# Patient Record
Sex: MALE | Race: WHITE
[De-identification: names, ages, dates, MRNs, and addresses within clinical notes are randomized per-mention and may not be internally consistent; named-entity substitution may affect disease eponyms.]

---

## 2017-01-16 ENCOUNTER — HOSPITAL ENCOUNTER (OUTPATIENT)
Dept: HOSPITAL 10 - E/R | Age: 27
Setting detail: OBSERVATION
LOS: 2 days | Discharge: HOME | End: 2017-01-18
Attending: INTERNAL MEDICINE | Admitting: INTERNAL MEDICINE
Payer: COMMERCIAL

## 2017-01-16 VITALS — WEIGHT: 180.78 LBS | BODY MASS INDEX: 35.49 KG/M2 | HEIGHT: 60 IN

## 2017-01-16 DIAGNOSIS — Z72.0: ICD-10-CM

## 2017-01-16 DIAGNOSIS — Z87.01: ICD-10-CM

## 2017-01-16 DIAGNOSIS — S30.1XXA: Primary | ICD-10-CM

## 2017-01-16 DIAGNOSIS — K59.00: ICD-10-CM

## 2017-01-16 DIAGNOSIS — Z23: ICD-10-CM
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
## 2017-01-16 LAB
ALBUMIN SERPL-MCNC: 4.8 G/DL (ref 3.3–4.9)
ALBUMIN/GLOB SERPL: 1.33 {RATIO}
ALP SERPL-CCNC: 94 IU/L (ref 42–121)
ALT SERPL-CCNC: 29 IU/L (ref 13–69)
ANION GAP SERPL CALC-SCNC: 19 MMOL/L (ref 8–16)
APTT BLD: 27.6 SEC (ref 25–35)
AST SERPL-CCNC: 53 IU/L (ref 15–46)
BASOPHILS # BLD AUTO: 0.1 10^3/UL (ref 0–0.1)
BASOPHILS NFR BLD: 0.6 % (ref 0–2)
BILIRUB DIRECT SERPL-MCNC: 0 MG/DL (ref 0–0.2)
BILIRUB SERPL-MCNC: 1 MG/DL (ref 0.2–1.3)
BUN SERPL-MCNC: 12 MG/DL (ref 7–20)
CALCIUM SERPL-MCNC: 9.9 MG/DL (ref 8.4–10.2)
CHLORIDE SERPL-SCNC: 99 MMOL/L (ref 97–110)
CO2 SERPL-SCNC: 26 MMOL/L (ref 21–31)
CONDITION: 1
CREAT SERPL-MCNC: 0.95 MG/DL (ref 0.61–1.24)
EOSINOPHIL # BLD: 0.1 10^3/UL (ref 0–0.5)
EOSINOPHIL NFR BLD: 0.9 % (ref 0–7)
ERYTHROCYTE [DISTWIDTH] IN BLOOD BY AUTOMATED COUNT: 13.2 % (ref 11.5–14.5)
GLOBULIN SER-MCNC: 3.6 G/DL (ref 1.3–3.2)
GLUCOSE SERPL-MCNC: 94 MG/DL (ref 70–220)
HCT VFR BLD CALC: 44.1 % (ref 42–52)
HGB BLD-MCNC: 14.9 G/DL (ref 14–18)
INR PPP: 0.96
LYMPHOCYTES # BLD AUTO: 3.8 10^3/UL (ref 0.8–2.9)
LYMPHOCYTES NFR BLD AUTO: 28.3 % (ref 15–51)
MCH RBC QN AUTO: 30.7 PG (ref 29–33)
MCHC RBC AUTO-ENTMCNC: 33.7 G/DL (ref 32–37)
MCV RBC AUTO: 90.9 FL (ref 82–101)
MONOCYTES # BLD: 1.1 10^3/UL (ref 0.3–0.9)
MONOCYTES NFR BLD: 7.8 % (ref 0–11)
NEUTROPHILS # BLD: 8.5 10^3/UL (ref 1.6–7.5)
NEUTROPHILS NFR BLD AUTO: 62.4 % (ref 39–77)
NRBC # BLD MANUAL: 0 10^3/UL (ref 0–0)
NRBC BLD QL: 0 /100WBC (ref 0–0)
PLATELET # BLD: 313 10^3/UL (ref 140–440)
PMV BLD AUTO: 8.5 FL (ref 7.4–10.4)
POTASSIUM SERPL-SCNC: 3.7 MMOL/L (ref 3.5–5.1)
PROT SERPL-MCNC: 8.4 G/DL (ref 6.1–8.1)
PROTHROMBIN TIME: 12.8 SEC (ref 12.2–14.2)
PT RATIO: 1
RBC # BLD AUTO: 4.85 10^6/UL (ref 4.7–6.1)
SODIUM SERPL-SCNC: 140 MMOL/L (ref 135–144)
WBC # BLD AUTO: 13.6 10^3/UL (ref 4.8–10.8)
WBC # BLD: 13.6 10^3/UL (ref 4.8–10.8)

## 2017-01-16 PROCEDURE — 81001 URINALYSIS AUTO W/SCOPE: CPT

## 2017-01-16 PROCEDURE — 85730 THROMBOPLASTIN TIME PARTIAL: CPT

## 2017-01-16 PROCEDURE — 83690 ASSAY OF LIPASE: CPT

## 2017-01-16 PROCEDURE — 90686 IIV4 VACC NO PRSV 0.5 ML IM: CPT

## 2017-01-16 PROCEDURE — 86140 C-REACTIVE PROTEIN: CPT

## 2017-01-16 PROCEDURE — 74177 CT ABD & PELVIS W/CONTRAST: CPT

## 2017-01-16 PROCEDURE — 96374 THER/PROPH/DIAG INJ IV PUSH: CPT

## 2017-01-16 PROCEDURE — 99285 EMERGENCY DEPT VISIT HI MDM: CPT

## 2017-01-16 PROCEDURE — 85610 PROTHROMBIN TIME: CPT

## 2017-01-16 PROCEDURE — 85651 RBC SED RATE NONAUTOMATED: CPT

## 2017-01-16 PROCEDURE — 81003 URINALYSIS AUTO W/O SCOPE: CPT

## 2017-01-16 PROCEDURE — 96375 TX/PRO/DX INJ NEW DRUG ADDON: CPT

## 2017-01-16 PROCEDURE — 80053 COMPREHEN METABOLIC PANEL: CPT

## 2017-01-16 PROCEDURE — 36415 COLL VENOUS BLD VENIPUNCTURE: CPT

## 2017-01-16 PROCEDURE — 72195 MRI PELVIS W/O DYE: CPT

## 2017-01-16 PROCEDURE — 85025 COMPLETE CBC W/AUTO DIFF WBC: CPT

## 2017-01-16 PROCEDURE — G0378 HOSPITAL OBSERVATION PER HR: HCPCS

## 2017-01-16 PROCEDURE — 74181 MRI ABDOMEN W/O CONTRAST: CPT

## 2017-01-17 VITALS — DIASTOLIC BLOOD PRESSURE: 67 MMHG | RESPIRATION RATE: 18 BRPM | SYSTOLIC BLOOD PRESSURE: 126 MMHG

## 2017-01-17 VITALS — DIASTOLIC BLOOD PRESSURE: 74 MMHG | SYSTOLIC BLOOD PRESSURE: 124 MMHG | RESPIRATION RATE: 20 BRPM

## 2017-01-17 VITALS — HEART RATE: 77 BPM | TEMPERATURE: 97.8 F

## 2017-01-17 VITALS — RESPIRATION RATE: 16 BRPM | SYSTOLIC BLOOD PRESSURE: 126 MMHG | DIASTOLIC BLOOD PRESSURE: 73 MMHG

## 2017-01-17 LAB
ADD UMIC: YES
ALBUMIN SERPL-MCNC: 4.1 G/DL (ref 3.3–4.9)
ALBUMIN/GLOB SERPL: 1.46 {RATIO}
ALP SERPL-CCNC: 69 IU/L (ref 42–121)
ALT SERPL-CCNC: 31 IU/L (ref 13–69)
ANION GAP SERPL CALC-SCNC: 15 MMOL/L (ref 8–16)
AST SERPL-CCNC: 38 IU/L (ref 15–46)
BASOPHILS # BLD AUTO: 0 10^3/UL (ref 0–0.1)
BASOPHILS NFR BLD: 0.3 % (ref 0–2)
BILIRUB DIRECT SERPL-MCNC: 0 MG/DL (ref 0–0.2)
BILIRUB SERPL-MCNC: 0.8 MG/DL (ref 0.2–1.3)
BUN SERPL-MCNC: 12 MG/DL (ref 7–20)
CALCIUM SERPL-MCNC: 9.1 MG/DL (ref 8.4–10.2)
CHLORIDE SERPL-SCNC: 100 MMOL/L (ref 97–110)
CO2 SERPL-SCNC: 28 MMOL/L (ref 21–31)
COLOR UR: (no result)
CONDITION: 1
CREAT SERPL-MCNC: 1 MG/DL (ref 0.61–1.24)
EOSINOPHIL # BLD: 0.1 10^3/UL (ref 0–0.5)
EOSINOPHIL NFR BLD: 0.9 % (ref 0–7)
ERYTHROCYTE [DISTWIDTH] IN BLOOD BY AUTOMATED COUNT: 13.1 % (ref 11.5–14.5)
GLOBULIN SER-MCNC: 2.8 G/DL (ref 1.3–3.2)
GLUCOSE SERPL-MCNC: 89 MG/DL (ref 70–220)
GLUCOSE UR STRIP-MCNC: NEGATIVE %
HCT VFR BLD CALC: 40.1 % (ref 42–52)
HGB BLD-MCNC: 13.6 G/DL (ref 14–18)
ICTOTEST: NEGATIVE
KETONES UR STRIP.AUTO-MCNC: 15 MG/DL
LYMPHOCYTES # BLD AUTO: 1.8 10^3/UL (ref 0.8–2.9)
LYMPHOCYTES NFR BLD AUTO: 22.4 % (ref 15–51)
MCH RBC QN AUTO: 31.1 PG (ref 29–33)
MCHC RBC AUTO-ENTMCNC: 34 G/DL (ref 32–37)
MCV RBC AUTO: 91.5 FL (ref 82–101)
MONOCYTES # BLD: 0.6 10^3/UL (ref 0.3–0.9)
MONOCYTES NFR BLD: 7.9 % (ref 0–11)
MUCOUS THREADS #/AREA URNS HPF: (no result) /[HPF]
NEUTROPHILS # BLD: 5.5 10^3/UL (ref 1.6–7.5)
NEUTROPHILS NFR BLD AUTO: 68.5 % (ref 39–77)
NITRITE UR QL STRIP.AUTO: NEGATIVE
NRBC # BLD MANUAL: 0 10^3/UL (ref 0–0)
NRBC BLD QL: 0 /100WBC (ref 0–0)
PLATELET # BLD: 266 10^3/UL (ref 140–440)
PMV BLD AUTO: 8.3 FL (ref 7.4–10.4)
POTASSIUM SERPL-SCNC: 4.1 MMOL/L (ref 3.5–5.1)
PROT SERPL-MCNC: 6.9 G/DL (ref 6.1–8.1)
RBC # BLD AUTO: 4.38 10^6/UL (ref 4.7–6.1)
RBC # UR AUTO: (no result) /UL
RBC #/AREA URNS HPF: (no result) /HPF
SODIUM SERPL-SCNC: 139 MMOL/L (ref 135–144)
SQUAMOUS #/AREA URNS HPF: (no result) /[HPF]
URINE BILIRUBIN (DIP): (no result)
URINE TOTAL PROTEIN (DIP): NEGATIVE
UROBILINOGEN UR STRIP-ACNC: (no result) (ref 0.1–1)
WBC # BLD AUTO: 8.1 10^3/UL (ref 4.8–10.8)
WBC # BLD: 8.1 10^3/UL (ref 4.8–10.8)
WBC # UR STRIP: NEGATIVE /UL

## 2017-01-17 RX ADMIN — FOLIC ACID SCH MLS/HR: 5 INJECTION, SOLUTION INTRAMUSCULAR; INTRAVENOUS; SUBCUTANEOUS at 23:30

## 2017-01-17 RX ADMIN — DOCUSATE SODIUM SCH MG: 100 CAPSULE, LIQUID FILLED ORAL at 15:54

## 2017-01-17 RX ADMIN — FOLIC ACID SCH MLS/HR: 5 INJECTION, SOLUTION INTRAMUSCULAR; INTRAVENOUS; SUBCUTANEOUS at 15:55

## 2017-01-17 RX ADMIN — Medication SCH MLS/HR: at 14:07

## 2017-01-17 RX ADMIN — HYDROCORTISONE ACETATE SCH MG: 25 SUPPOSITORY RECTAL at 22:03

## 2017-01-17 RX ADMIN — HYDROCORTISONE ACETATE SCH MG: 25 SUPPOSITORY RECTAL at 15:54

## 2017-01-17 RX ADMIN — Medication SCH MLS/HR: at 22:03

## 2017-01-17 RX ADMIN — DOCUSATE SODIUM SCH MG: 100 CAPSULE, LIQUID FILLED ORAL at 22:03

## 2017-01-17 RX ADMIN — CIPROFLOXACIN SCH MLS/HR: 2 INJECTION, SOLUTION INTRAVENOUS at 22:03

## 2017-01-17 RX ADMIN — Medication SCH MLS/HR: at 06:51

## 2017-01-17 RX ADMIN — CIPROFLOXACIN SCH MLS/HR: 2 INJECTION, SOLUTION INTRAVENOUS at 08:58

## 2017-01-17 NOTE — HP
DATE OF ADMISSION: 01/17/2017

 

CHIEF COMPLAINT:  Abdominal pain.

 

HISTORY OF PRESENT ILLNESS:  The patient is a 26-year-old male with no significant past medical hist
ory who presented to the emergency department with right-sided abdominal pain, mainly at the right l
ower quadrant area.  He denied any associated nausea, vomiting, constipation, or diarrhea.  He also 
denied any fever, chills, chest pain or shortness of breath.  When he presented to the ER, a CT abdo
men and pelvis was done which showed thickening of the right inferior rectus muscle with inflammator
y change, which could be a muscular injury or even possibly infectious etiology or a hematoma.  He a
lso presented with a white count of 13.3.  Otherwise, the rest of his labs and vitals were in the no
rmal range.  The patient denied any trauma to the abdominal area.  

 

REVIEW OF SYSTEMS:  A 12-point review of systems was performed and negative except as mentioned in H
PI.

 

PAST MEDICAL HISTORY:  Denied.

 

SOCIAL HISTORY:  Denied a history of tobacco, alcohol or illicit drug use.

 

ALLERGIES:  NO KNOWN DRUG ALLERGIES.

 

HOME MEDICATIONS:  Pseudoephedrine

 

PHYSICAL EXAMINATION:

VITAL SIGNS:  Stable.

GENERAL:  No acute distress, answering questions appropriately, cooperative.

HEENT:  Normocephalic, atraumatic.  No scleral icterus.  Pupils are reactive to light.

CARDIOVASCULAR:  Regular rate and rhythm with no extra sounds.

LUNGS:  Clear.

ABDOMEN:  Soft.  There is some tenderness in the right lower quadrant area with no guarding, no rigi
dity, no rebound tenderness.

 

LABORATORIES:  WBC 13.3.

 

IMAGING:  CT abdomen and pelvis with results as mentioned in the HPI.

 

IMPRESSION:

1.  Right lower quadrant abdominal pain, secondary to possibly some muscular injury or possible infe
ctious etiology.

 

PLAN:  

1.  Provide pain medication.

2.  He will also be placed on antibiotics.  Will check a blood culture.  

3.  We will obtain an MRI to further evaluate what was seen on the CT abdomen and pelvis.

4.  For now, pain management and antibiotics will be the mainstay of treatment.  

 

Further workup and management per clinical course.

 

 

Dictated By: LEONIDAS GARSIA/LEONARD

DD:    01/17/2017 10:49:15

DT:    01/17/2017 11:21:21

Conf#: [id number]

DID#:  [id number]

## 2017-01-17 NOTE — ERA
ER Documentation


Chief Complaint


Date/Time


DATE: 1/17/17 


TIME: 02:16


Chief Complaint


sent by [facility] r/o appendecitis rlq pain and feverx1 week





HPI


26-year-old male who complains of right lower quadrant abdominal pain radiating 

to his right groin for the past week.  He does have low-grade temperatures.  

Patient was seen in urgent care and referred for possible appendicitis.  Pain 

is mild to moderate intensity.  He is also been constipated for a week.  No 

fevers no chills.  No other current complaints.





ROS


All systems reviewed and are negative except as per history of present illness.





Medications


Home Meds


Reported Medications


Fexofenadine/Pseudoephedrine (Allegra-D 24 Hour Tablet) 1 Each Tab.er.24h, 1 

EACH PO DAILY


   1/16/17





Allergies


Allergies:  


Coded Allergies:  


     No Known Allergy (Unverified , 1/16/17)





PMhx/Soc


Medical and Surgical Hx:  pt denies Surgical Hx


History of Surgery:  No


Anesthesia Reaction:  No


Hx Neurological Disorder:  No


Hx Respiratory Disorders:  Yes (PNA MARCH 2016)


Hx Cardiac Disorders:  No


Hx Psychiatric Problems:  No


Hx Miscellaneous Medical Probl:  No


Hx Alcohol Use:  Yes


Hx Substance Use:  Yes (MARIJUANA)


Hx Tobacco Use:  Yes


Smoking Status:  Current every day smoker





Physical Exam


Vitals





Vital Signs








  Date Time  Temp Pulse Resp B/P Pulse Ox O2 Delivery O2 Flow Rate FiO2


 


1/16/17 22:55 98.3 60 20 140/87 100 Room Air  


 


1/16/17 19:23 98.2 81 20 126/84 98   








Physical Exam


Const:  []


Head:   Atraumatic 


Eyes:    Normal Conjunctiva


ENT:    Normal External Ears, Nose and Mouth.


Neck:               Full range of motion..~ No meningismus.


Resp:    Clear to auscultation bilaterally


Cardio:    Regular rate and rhythm, no murmurs


Abd:    Soft, non tender, non distended. Normal bowel sounds


Skin:    No petechiae or rashes


Back:    No midline or flank tenderness


Ext:    No cyanosis, or edema


Neur:    Awake and alert


Psych:    Normal Mood and Affect


Result Diagram:  


1/16/17 2305                                                                   

             1/16/17 2305





Results 24 hrs





 Laboratory Tests








Test


  1/16/17


23:05 1/16/17


23:45


 


Activated Partial


Thromboplast Time 27.6Sec 


  


 


 


Alanine Aminotransferase


(ALT/SGPT) 29IU/L 


  


 


 


Albumin 4.8g/dl  


 


Albumin/Globulin Ratio 1.33  


 


Alkaline Phosphatase 94IU/L  


 


Anion Gap 19  


 


Aspartate Amino Transf


(AST/SGOT) 53IU/L 


  


 


 


Basophils # 0.110^3/ul  


 


Basophils % 0.6%  


 


Blood Urea Nitrogen 12mg/dl  


 


Calcium Level 9.9mg/dl  


 


Carbon Dioxide Level 26mmol/L  


 


Chloride Level 99mmol/L  


 


Creatinine 0.95mg/dl  


 


Direct Bilirubin 0.00mg/dl  


 


Eosinophils # 0.110^3/ul  


 


Eosinophils % 0.9%  


 


Globulin 3.60g/dl  


 


Glucose Level 94mg/dl  


 


Hematocrit 44.1%  


 


Hemoglobin 14.9g/dl  


 


INR International Normalized


Ratio 0.96 


  


 


 


Indirect Bilirubin 1.0mg/dl  


 


Lipase 44U/L  


 


Lymphocytes # 3.810^3/ul  


 


Lymphocytes % 28.3%  


 


Mean Corpuscular Hemoglobin 30.7pg  


 


Mean Corpuscular Hemoglobin


Concent 33.7g/dl 


  


 


 


Mean Corpuscular Volume 90.9fl  


 


Mean Platelet Volume 8.5fl  


 


Monocytes # 1.110^3/ul  


 


Monocytes % 7.8%  


 


Neutrophils # 8.510^3/ul  


 


Neutrophils % 62.4%  


 


Nucleated Red Blood Cells # 0.010^3/ul  


 


Nucleated Red Blood Cells % 0.0/100WBC  


 


Platelet Count 82942^3/UL  


 


Potassium Level 3.7mmol/L  


 


Prothrombin Time 12.8Sec  


 


Prothrombin Time Ratio 1.0  


 


Red Blood Count 4.8510^6/ul  


 


Red Cell Distribution Width 13.2%  


 


Sodium Level 140mmol/L  


 


Total Bilirubin 1.0mg/dl  


 


Total Protein 8.4g/dl  


 


White Blood Count 13.610^3/ul  


 


Urine Bilirubin  1+ 


 


Urine Clarity  CLEAR 


 


Urine Color  LT. YELLOW 


 


Urine Glucose  NEGATIVE% 


 


Urine Hemoglobin  TRACE 


 


Urine Ictotest  NEGATIVE 


 


Urine Ketones  15 


 


Urine Leukocyte Esterase  NEGATIVE 


 


Urine Microscopic RBC  0-2/HPF 


 


Urine Microscopic WBC  NONE SEEN/HPF 


 


Urine Mucus  FEW 


 


Urine Nitrite  NEGATIVE 


 


Urine Specific Gravity  >=1.030 


 


Urine Squamous Epithelial


Cells 


  RARE 


 


 


Urine Total Protein  NEGATIVE 


 


Urine Urobilinogen  0.2  E.U./dL 


 


Urine pH  6.0 








 Current Medications








 Medications


  (Trade)  Dose


 Ordered  Sig/Yoel


 Route


 PRN Reason  Start Time


 Stop Time Status Last Admin


Dose Admin


 


 Sodium Chloride


  (NS)  1,000 ml @ 


 1,000 mls/hr  Q1H STAT


 IV


   1/16/17 22:52


 1/16/17 23:51 DC 1/16/17 23:12


 


 


 Morphine Sulfate


  (morphine)  4 mg  ONCE  STAT


 IV


   1/16/17 22:52


 1/16/17 22:54 DC 1/16/17 23:13


 


 


 Ondansetron HCl 4


 mg  4 mg  ONCE  STAT


 IV


   1/16/17 22:52


 1/16/17 22:54 DC 1/16/17 23:12


 


 


 Sodium Chloride


  (NS)  100 ml @ ud  STK-MED ONCE


 .ROUTE


   1/16/17 23:42


 1/16/17 23:43 DC 1/17/17 00:03


 


 


 Iohexol 150 ml  150 ml  STK-MED ONCE


 .ROUTE


   1/16/17 23:42


 1/16/17 23:43 DC 1/17/17 00:03


 


 


 Piperacillin Sod/


 Tazobactam Sod


  (Zosyn 3.375gm/


 100 ml (Pmx))  100 ml @ 


 200 mls/hr  ONCE  ONCE


 IVPB


   1/17/17 01:30


 1/17/17 01:59 DC 1/17/17 01:19


 











Procedures/MDM


CBC:      Elevated white count


CMP:      [no e/o severe acidosis, alkalosis, renal failure, diabetic 

ketoacidosis, liver disease]


Lipase:               [no e/o pancreatitis]


PT/INR:   [normal coagulation]


Urine:      [no e/o acute infection or hematuria]





CT shows rectus sheath hematoma





Medical decision-making: Patient comes in with a 3 rectus sheath hematoma 

versus abscess.  At this point he was on antibiotics empirically.  Patient will 

be admitted to hospitalist.





Departure


Diagnosis:  


 Primary Impression:  


 Abdominal pain


 Qualified Code:  R10.31 - Right lower quadrant abdominal pain


 Additional Impression:  


 Rectus sheath hematoma


 Qualified Code:  S30.1XXA - Rectus sheath hematoma, initial encounter


Condition:  Stable











LEONIDAS BOURGEOISJackelin Jan 17, 2017 02:17

## 2017-01-17 NOTE — RADRPT
PROCEDURE:    CT ABDOMEN/PELVIS WITH CONTRAST  

 

CLINICAL INDICATION:   26-year-old male with abdominal pain. 

 

TECHNIQUE:   The study was performed utilizing a GE LightSpeAlphaClone VCT 64-slice CT scanner.  Direct axia
l sections were obtained through the abdomen and pelvis with the use of 100 cc of Omnipaque-300 iggy
onic intravenous contrast material. Sagittal and coronal reformations were obtained. Automated expos
ure control and iterative reconstruction techniques were utilized for this examination.  The images 
were reviewed on a PACS workstation.  CTD/vol = 10.7 mGy; Total Exam DLP = 672.1 mGy-cm. 

 

COMPARISON:    None. 

 

FINDINGS:

 

The lung bases are unremarkable.  There is no evidence for significant pleural effusion.  The liver 
has a normal size and contour without focal areas of abnormal density or contrast enhancement. No in
trahepatic nor extrahepatic biliary ductal dilatation is seen. The gallbladder demonstrates no wall 
thickening nor pericholecystic fluid. No biliary stones are evident. The pancreas is without areas o
f abnormal attenuation or contrast enhancement.  This spleen is identified and has a normal size wit
hout abnormal density or contrast enhancement. The adrenal glands are unremarkable. The kidneys are 
functional bilaterally without abnormal density. No hydroureteronephrosis nor nephroureterolithiasis
 is evident. The urinary bladder contains a small volume of urine with a mildly thickened wall measu
ring up to 8 mm.  There is diffuse asymmetric thickening of the inferior right rectus muscle extendi
ng from the mid to inferior aspect with mild surrounding inflammatory changes.  There is mild 

retained stool identified within the ascending and transverse colon without obstruction.  The append
ix is visualized and is without edema or surrounding inflammatory reaction. There is no significant 
free fluid.  The aortoiliac vessels are without aneurysmal dilatation. The osseous structures are in
tact. 

 

IMPRESSION:

 

1.  Retained stool without gross bowel obstruction.

2.  No CT evidence for appendicitis.

3.  Small volume bladder with mild thickened wall. 

4.  Diffusely asymmetric thickening right inferior rectus muscle with mild surrounding inflammatory 
changes which may represent a rectus muscle injury and mild hematoma. Other possibilities include in
fectious process.  Clinical correlation is necessary.

_____________________________________________ 

.Michael Bustillo MD, MD           Date    Time 

Electronically viewed and signed by .Michael Bustillo MD, MD on 01/17/2017 01:00 

 

D:  01/17/2017 01:00  T:  01/17/2017 01:00

.JOE

## 2017-01-17 NOTE — PN
Date/Time of Note


Date/Time of Note


DATE: 1/17/17 


TIME: 15:16





Assessment/Plan


VTE Prophylaxis


VTE Prophylaxis Intervention:  ambulation, contraindicated


VTE Contraindication Reason:  bleeding





Lines/Catheters


IV Catheter Type (from Nrsg):  Saline Lock





Assessment/Plan


Chief Complaint/Hosp Course


A/P


1) Abd wall hematoma/ rectus sheath tear; stable. mri; pain control/ binder; dc 

home 1/18


2) Constipation


3) Tobacco/ marijuana


Problems:  





Subjective


24 Hr Interval Summary


Free Text/Dictation


less pain. no fever. snowboarding in [place] recently. also works, with 

motorbike repair; lots of lifting.





Exam/Review of Systems


Vital Signs


Vitals





 Vital Signs








  Date Time  Temp Pulse Resp B/P Pulse Ox O2 Delivery O2 Flow Rate FiO2


 


1/17/17 07:41 98.6 62 18 126/67 98   


 


1/17/17 03:30      Room Air  














 Intake and Output   


 


 1/16/17 1/16/17 1/17/17





 15:00 23:00 07:00


 


Intake Total   0 ml


 


Output Total   0 ml


 


Balance   0 ml











Exam


Constitutional:  alert


Respiratory:  clear to auscultation


Cardiovascular:  regular rate and rhythm


Gastrointestinal:  soft (mild tender; no r r g; no ecchymosis)


Extremities:  other (no edema)





Results


Result Diagram:  


1/17/17 0940                                                                   

             1/17/17 0940





Results 24 hrs





Laboratory Tests








Test


  1/16/17


23:05 1/16/17


23:45 1/17/17


09:40


 


Activated Partial


Thromboplast Time 27.6  


  


  


 


 


Alanine Aminotransferase


(ALT/SGPT) 29  


  


  31  


 


 


Albumin 4.8    4.1  


 


Albumin/Globulin Ratio 1.33    1.46  


 


Alkaline Phosphatase 94    69  


 


Anion Gap 19  H  15  


 


Aspartate Amino Transf


(AST/SGOT) 53  H


  


  38  


 


 


Basophils # 0.1    0.0  


 


Basophils % 0.6    0.3  


 


Blood Urea Nitrogen 12    12  


 


Calcium Level 9.9    9.1  


 


Carbon Dioxide Level 26    28  


 


Chloride Level 99    100  


 


Creatinine 0.95    1.00  


 


Direct Bilirubin 0.00    0.00  


 


Eosinophils # 0.1    0.1  


 


Eosinophils % 0.9    0.9  


 


Globulin 3.60  H  2.80  


 


Glucose Level 94    89  


 


Hematocrit 44.1    40.1  L


 


Hemoglobin 14.9    13.6  L


 


INR International Normalized


Ratio 0.96  


  


  


 


 


Indirect Bilirubin 1.0    0.8  


 


Lipase 44    


 


Lymphocytes # 3.8  H  1.8  


 


Lymphocytes % 28.3    22.4  


 


Mean Corpuscular Hemoglobin 30.7    31.1  


 


Mean Corpuscular Hemoglobin


Concent 33.7  


  


  34.0  


 


 


Mean Corpuscular Volume 90.9    91.5  


 


Mean Platelet Volume 8.5    8.3  


 


Monocytes # 1.1  H  0.6  


 


Monocytes % 7.8    7.9  


 


Neutrophils # 8.5  H  5.5  


 


Neutrophils % 62.4    68.5  


 


Nucleated Red Blood Cells # 0.0    0.0  


 


Nucleated Red Blood Cells % 0.0    0.0  


 


Platelet Count 313    266  


 


Potassium Level 3.7    4.1  


 


Prothrombin Time 12.8    


 


Prothrombin Time Ratio 1.0    


 


Red Blood Count 4.85    4.38  L


 


Red Cell Distribution Width 13.2    13.1  


 


Sodium Level 140    139  


 


Total Bilirubin 1.0    0.8  


 


Total Protein 8.4  H  6.9  #


 


White Blood Count 13.6  H  8.1  #


 


Urine Bilirubin  1+  H 


 


Urine Clarity  CLEAR   


 


Urine Color  LT. YELLOW   


 


Urine Glucose  NEGATIVE   


 


Urine Hemoglobin  TRACE   


 


Urine Ictotest  NEGATIVE   


 


Urine Ketones  15   


 


Urine Leukocyte Esterase  NEGATIVE   


 


Urine Microscopic RBC  0-2   


 


Urine Microscopic WBC  NONE SEEN   


 


Urine Mucus  FEW   


 


Urine Nitrite  NEGATIVE   


 


Urine Specific Gravity  >=1.030  H 


 


Urine Squamous Epithelial


Cells 


  RARE  


  


 


 


Urine Total Protein  NEGATIVE   


 


Urine Urobilinogen  0.2  E.U./dL   


 


Urine pH  6.0   











Medications


Medications





 Current Medications


Morphine Sulfate (morphine) 3 mg Q4H  PRN IV PAIN Last administered on 1/17/ 17at 06:51; Admin Dose 3 MG;  Start 1/17/17 at 06:00


Ondansetron HCl 4 mg 4 mg Q6H  PRN IV NAUSEA AND/OR VOMITING;  Start 1/17/17 at 

06:00


Metronidazole 100 ml @  100 mls/hr Q8 IVPB  Last administered on 1/17/17at 14:07

; Admin Dose 100 MLS/HR;  Start 1/17/17 at 06:00


Ciprofloxacin/ Dextrose (Cipro Ivpb) 200 ml @  200 mls/hr Q12 IVPB  Last 

administered on 1/17/17at 08:58; Admin Dose 200 MLS/HR;  Start 1/17/17 at 09:00


Influenza Virus Vaccine (Fluzone) 0.5 ml ONCE ONCE IM* ;  Start 1/18/17 at 09:00

;  Stop 1/18/17 at 09:01


Acetaminophen (Tylenol Tab) 650 mg Q4H  PRN PO PAIN AND OR ELEVATED TEMP Last 

administered on 1/17/17at 12:11; Admin Dose 650 MG;  Start 1/17/17 at 12:00


Acetaminophen/ Hydrocodone Bitart (Norco (10/325)) 1 tab Q4H  PRN PO PAIN;  

Start 1/17/17 at 14:30


Docusate Sodium (Colace) 100 mg BID PO ;  Start 1/17/17 at 14:30


Hydrocortisone (Anusol-Hc Supp) 25 mg BID FL ;  Start 1/17/17 at 16:00


Bisacodyl (Dulcolax) 10 mg DAILY  PRN PO CONSTIPATION;  Start 1/17/17 at 14:30











JACQUES PATTERSON MD Jan 17, 2017 15:19

## 2017-01-18 VITALS — RESPIRATION RATE: 18 BRPM | SYSTOLIC BLOOD PRESSURE: 126 MMHG | DIASTOLIC BLOOD PRESSURE: 73 MMHG

## 2017-01-18 LAB
ALBUMIN SERPL-MCNC: 3.9 G/DL (ref 3.3–4.9)
ALBUMIN/GLOB SERPL: 1.21 {RATIO}
ALP SERPL-CCNC: 64 IU/L (ref 42–121)
ALT SERPL-CCNC: 30 IU/L (ref 13–69)
ANION GAP SERPL CALC-SCNC: 15 MMOL/L (ref 8–16)
AST SERPL-CCNC: 34 IU/L (ref 15–46)
BASOPHILS # BLD AUTO: 0 10^3/UL (ref 0–0.1)
BASOPHILS NFR BLD: 0.4 % (ref 0–2)
BILIRUB DIRECT SERPL-MCNC: 0 MG/DL (ref 0–0.2)
BILIRUB SERPL-MCNC: 0.5 MG/DL (ref 0.2–1.3)
BUN SERPL-MCNC: 10 MG/DL (ref 7–20)
CALCIUM SERPL-MCNC: 9.2 MG/DL (ref 8.4–10.2)
CHLORIDE SERPL-SCNC: 103 MMOL/L (ref 97–110)
CO2 SERPL-SCNC: 26 MMOL/L (ref 21–31)
CONDITION: 1
CREAT SERPL-MCNC: 0.95 MG/DL (ref 0.61–1.24)
CRP SERPL-MCNC: 3.1 MG/DL (ref 0–0.9)
EOSINOPHIL # BLD: 0.1 10^3/UL (ref 0–0.5)
EOSINOPHIL NFR BLD: 0.8 % (ref 0–7)
ERYTHROCYTE [DISTWIDTH] IN BLOOD BY AUTOMATED COUNT: 12.7 % (ref 11.5–14.5)
GLOBULIN SER-MCNC: 3.2 G/DL (ref 1.3–3.2)
GLUCOSE SERPL-MCNC: 91 MG/DL (ref 70–220)
HCT VFR BLD CALC: 39.5 % (ref 42–52)
HGB BLD-MCNC: 13.6 G/DL (ref 14–18)
LYMPHOCYTES # BLD AUTO: 2.2 10^3/UL (ref 0.8–2.9)
LYMPHOCYTES NFR BLD AUTO: 27.5 % (ref 15–51)
MCH RBC QN AUTO: 31.3 PG (ref 29–33)
MCHC RBC AUTO-ENTMCNC: 34.5 G/DL (ref 32–37)
MCV RBC AUTO: 90.8 FL (ref 82–101)
MONOCYTES # BLD: 0.8 10^3/UL (ref 0.3–0.9)
MONOCYTES NFR BLD: 9.8 % (ref 0–11)
NEUTROPHILS # BLD: 4.9 10^3/UL (ref 1.6–7.5)
NEUTROPHILS NFR BLD AUTO: 61.5 % (ref 39–77)
NRBC # BLD MANUAL: 0 10^3/UL (ref 0–0)
NRBC BLD QL: 0 /100WBC (ref 0–0)
PLATELET # BLD: 282 10^3/UL (ref 140–440)
PMV BLD AUTO: 8.4 FL (ref 7.4–10.4)
POTASSIUM SERPL-SCNC: 4.3 MMOL/L (ref 3.5–5.1)
PROT SERPL-MCNC: 7.1 G/DL (ref 6.1–8.1)
RBC # BLD AUTO: 4.35 10^6/UL (ref 4.7–6.1)
SODIUM SERPL-SCNC: 140 MMOL/L (ref 135–144)
WBC # BLD AUTO: 7.9 10^3/UL (ref 4.8–10.8)
WBC # BLD: 7.9 10^3/UL (ref 4.8–10.8)

## 2017-01-18 RX ADMIN — FOLIC ACID SCH MLS/HR: 5 INJECTION, SOLUTION INTRAMUSCULAR; INTRAVENOUS; SUBCUTANEOUS at 07:30

## 2017-01-18 RX ADMIN — Medication SCH MLS/HR: at 05:14

## 2017-01-18 RX ADMIN — FOLIC ACID SCH MLS/HR: 5 INJECTION, SOLUTION INTRAMUSCULAR; INTRAVENOUS; SUBCUTANEOUS at 08:20

## 2017-01-18 RX ADMIN — HYDROCORTISONE ACETATE SCH MG: 25 SUPPOSITORY RECTAL at 09:00

## 2017-01-18 RX ADMIN — Medication SCH MLS/HR: at 14:00

## 2017-01-18 RX ADMIN — CIPROFLOXACIN SCH MLS/HR: 2 INJECTION, SOLUTION INTRAVENOUS at 09:11

## 2017-01-18 RX ADMIN — DOCUSATE SODIUM SCH MG: 100 CAPSULE, LIQUID FILLED ORAL at 09:10

## 2017-01-18 RX ADMIN — FOLIC ACID SCH MLS/HR: 5 INJECTION, SOLUTION INTRAMUSCULAR; INTRAVENOUS; SUBCUTANEOUS at 15:30

## 2017-01-18 NOTE — PN
Date/Time of Note


Date/Time of Note


DATE: 1/18/17 


TIME: 14:51





Assessment/Plan


VTE Prophylaxis


VTE Prophylaxis Intervention:  ambulation, SCD's





Lines/Catheters


IV Catheter Type (from Nrs):  Peripheral IV





Assessment/Plan


Chief Complaint/Hosp Course


Assessment and plan





1. Abdominal wall suspect hematoma versus rectus sheath tear. MRI of abdomen 

unremarkable. MRI of the pelvis is pending. Continue with analgesics as needed.





2. History of constipation. We'll provide with laxatives as needed





Disposition and plan: Follow up on MRI of pelvis. Continue analgesics as 

needed. Further recommendations per clinical course





Discussed plan of care with Dr. Millan


Problems:  





Subjective


24 Hr Interval Summary


Free Text/Dictation


No acute signs or symptoms of distress





Exam/Review of Systems


Vital Signs


Vitals





 Vital Signs








  Date Time  Temp Pulse Resp B/P Pulse Ox O2 Delivery O2 Flow Rate FiO2


 


1/18/17 07:46 98.2 57 18 126/73 98   


 


1/17/17 03:30      Room Air  














 Intake and Output   


 


 1/17/17 1/17/17 1/18/17





 15:00 23:00 07:00


 


Intake Total 200 ml 1245 ml 1375 ml


 


Balance 200 ml 1245 ml 1375 ml











Exam


General: No acute signs or symptoms of distress


Eyes: pupils equal round, Anicteric sclera


Neck: Supple nontender, no JVD


Cardiac: S1, S2 auscultated, regular rhythm and rate


Pulmonary: No coarse rhonchi or breathing auscultated


GI: Minimal tenderness near pelvis area


Extremities: No edema bilateral lower extremities


Skin: Clean dry and intact


Neurologic: Alert to person place and time and situation





Results


Result Diagram:  


1/18/17 0600                                                                   

             1/18/17 0600





Results 24 hrs





Laboratory Tests








Test


  1/18/17


06:00


 


Alanine Aminotransferase


(ALT/SGPT) 30  


 


 


Albumin 3.9  


 


Albumin/Globulin Ratio 1.21  


 


Alkaline Phosphatase 64  


 


Anion Gap 15  


 


Aspartate Amino Transf


(AST/SGOT) 34  


 


 


Basophils # 0.0  


 


Basophils % 0.4  


 


Blood Urea Nitrogen 10  


 


C-Reactive Protein 3.1  H


 


Calcium Level 9.2  


 


Carbon Dioxide Level 26  


 


Chloride Level 103  


 


Creatinine 0.95  


 


Direct Bilirubin 0.00  


 


Eosinophils # 0.1  


 


Eosinophils % 0.8  


 


Erythrocyte Sedimentation Rate 4  


 


Globulin 3.20  


 


Glucose Level 91  


 


Hematocrit 39.5  L


 


Hemoglobin 13.6  L


 


Indirect Bilirubin 0.5  


 


Lymphocytes # 2.2  


 


Lymphocytes % 27.5  


 


Mean Corpuscular Hemoglobin 31.3  


 


Mean Corpuscular Hemoglobin


Concent 34.5  


 


 


Mean Corpuscular Volume 90.8  


 


Mean Platelet Volume 8.4  


 


Monocytes # 0.8  


 


Monocytes % 9.8  


 


Neutrophils # 4.9  


 


Neutrophils % 61.5  


 


Nucleated Red Blood Cells # 0.0  


 


Nucleated Red Blood Cells % 0.0  


 


Platelet Count 282  


 


Potassium Level 4.3  


 


Red Blood Count 4.35  L


 


Red Cell Distribution Width 12.7  


 


Sodium Level 140  


 


Total Bilirubin 0.5  


 


Total Protein 7.1  


 


White Blood Count 7.9  











Medications


Medications





 Current Medications


Morphine Sulfate (morphine) 3 mg Q4H  PRN IV PAIN Last administered on 1/17/ 17at 06:51; Admin Dose 3 MG;  Start 1/17/17 at 06:00


Ondansetron HCl 4 mg 4 mg Q6H  PRN IV NAUSEA AND/OR VOMITING;  Start 1/17/17 at 

06:00


Metronidazole 100 ml @  100 mls/hr Q8 IVPB  Last administered on 1/18/17at 05:14

; Admin Dose 100 MLS/HR;  Start 1/17/17 at 06:00


Ciprofloxacin/ Dextrose (Cipro Ivpb) 200 ml @  200 mls/hr Q12 IVPB  Last 

administered on 1/18/17at 09:11; Admin Dose 200 MLS/HR;  Start 1/17/17 at 09:00


Acetaminophen (Tylenol Tab) 650 mg Q4H  PRN PO PAIN AND OR ELEVATED TEMP Last 

administered on 1/17/17at 12:11; Admin Dose 650 MG;  Start 1/17/17 at 12:00


Acetaminophen/ Hydrocodone Bitart (Norco (10/325)) 1 tab Q4H  PRN PO PAIN;  

Start 1/17/17 at 14:30


Docusate Sodium (Colace) 100 mg BID PO  Last administered on 1/18/17at 09:10; 

Admin Dose 100 MG;  Start 1/17/17 at 14:30


Hydrocortisone (Anusol-Hc Supp) 25 mg BID ME  Last administered on 1/17/17at 22:

03; Admin Dose 25 MG;  Start 1/17/17 at 16:00


Bisacodyl 10 mg 10 mg DAILY  PRN PO CONSTIPATION Last administered on 1/17/17at 

15:54; Admin Dose 10 MG;  Start 1/17/17 at 14:30


Sodium Chloride (NS) 1,000 ml @  125 mls/hr Q8H IV  Last administered on 1/17/ 17at 15:55; Admin Dose 125 MLS/HR;  Start 1/17/17 at 15:30











NAVDEEP BROUSSARD Jan 18, 2017 14:53

## 2017-01-18 NOTE — RADRPT
PROCEDURE:   MR Pelvis without contrast. 

 

CLINICAL INDICATION:   Rectus sheath hematoma.  Pain.  Follow-up.

 

TECHNIQUE:   MRI of the pelvis was performed on the [facility] high field MRI scanner. The patient was sc
anned without IV contrast.  Images were reviewed on a high-resolution PACS workstation. 

 

COMPARISON:   CT scan pelvis dated 01/16/2017  

 

FINDINGS:

 

Infiltration and edema of the right anterior lower rectus sheath is identified.  No contrast was adm
inistered.  Findings are consistent with a rectus sheath hematoma which appears stable when compared
 to the prior study.  Underlying neoplasm is considered unlikely.  No administration of contrast was
 given to assess for underlying neoplasm although this is considered unlikely.  Follow-up is advised
.  The left rectus sheath is unremarkable.  The remaining pelvic organs and pelvic structures are no
rmal.  The osseous structures are normal.

 

IMPRESSION:

 

1.  Stable appearances of a presumed rectus sheath hematoma in the right lower inferior pelvic rectu
s sheath.  This is unchanged since the previous CT scan and presumably benign, and simple follow-up 
over time is recommended to ensure resolution.

 

 

RPTAT: HMJB

_____________________________________________ 

.Meliton Mclean MD, MD           Date    Time 

Electronically viewed and signed by .Meliton Mclean MD, MD on 01/18/2017 17:36 

 

D:  01/18/2017 17:36  T:  01/18/2017 17:36

.B/

## 2017-01-18 NOTE — PDOCDIS
Discharge Instructions


DIAGNOSIS


Discharge Diagnosis:  1. Abdominal pain secondary to rectus muscle injury





CONDITION


Patient Condition:  Stable





HOME CARE INSTRUCTIONS:


Special Diet:  REGULAR





ACTIVITY:








Activity Restrictions:   Slowly Increase Activity











FOLLOW UP/APPOINTMENTS


Appointments


1. Follow up with your primary care provider in one week.





OTHER ORDERS:


Other Orders:


1. Call your primary care provider if worsening abdominal pain.











NAVDEEP BROUSSARD Jan 18, 2017 12:02

## 2017-01-18 NOTE — RADRPT
PROCEDURE:   MR Abdomen. 

 

CLINICAL INDICATION:   Abdominal pain. Suspected abdominal hematoma. Abnormal CT scan.

 

TECHNIQUE:   MRI abdomen without contrast was performed on the a high-resolution, high Daphnie field [facility] scanner.  Patient was examined without IV contrast. Images were reviewed on a high-[employer]
n PACS workstation.

 

COMPARISON:   CT scan dated 01/16/2017  

 

FINDINGS:

 

MRI Abdomen:

 

The liver is normal in size and homogeneous in signal intensity.  There is normal signal intensity w
ithin the liver.  No liver mass lesion or intrahepatic biliary dilatation is seen.  The spleen is no
rmal in size and homogeneous in signal intensity.  The stomach is partially collapsed but grossly un
remarkable.  The gallbladder is unremarkable.  The biliary tree is not dilated.  No intrahepatic nor
 extrahepatic biliary dilatation is present.  The pancreas, as visualized, is equally unremarkable. 
 No pancreatic lesion is seen.  The adrenal glands and kidneys are symmetrically normal.  The aorta 
is of normal caliber.  There is no retroperitoneal lymphadenopathy.  The bowel and mesentery, as vis
ualized, are all unremarkable.  

 

IMPRESSION:

 

1.  Unremarkable MRI abdomen. 

2.  The rectus sheath hematoma is involving the lower inferior rectus sheath within the anterior pel
rodrick wall.  This is not visualized on this MRI abdomen study.  A new MRI pelvis study has already bee
n ordered and will be performed shortly in a STAT fashion and will be dictated separately.

 

 

RPTAT: HMJB

_____________________________________________ 

.Meliton Mclean MD, MD           Date    Time 

Electronically viewed and signed by .Meliton Mclean MD, MD on 01/18/2017 12:14 

 

D:  01/18/2017 12:14  T:  01/18/2017 12:14

.B/

## 2019-06-02 ENCOUNTER — HOSPITAL ENCOUNTER (EMERGENCY)
Dept: HOSPITAL 91 - FTE | Age: 29
Discharge: HOME | End: 2019-06-02
Payer: SELF-PAY

## 2019-06-02 ENCOUNTER — HOSPITAL ENCOUNTER (EMERGENCY)
Dept: HOSPITAL 10 - FTE | Age: 29
Discharge: HOME | End: 2019-06-02
Payer: SELF-PAY

## 2019-06-02 VITALS
BODY MASS INDEX: 27.07 KG/M2 | HEIGHT: 69 IN | WEIGHT: 182.76 LBS | WEIGHT: 182.76 LBS | BODY MASS INDEX: 27.07 KG/M2 | HEIGHT: 69 IN

## 2019-06-02 VITALS — DIASTOLIC BLOOD PRESSURE: 75 MMHG | RESPIRATION RATE: 18 BRPM | HEART RATE: 60 BPM | SYSTOLIC BLOOD PRESSURE: 131 MMHG

## 2019-06-02 DIAGNOSIS — M25.511: Primary | ICD-10-CM

## 2019-06-02 PROCEDURE — 99283 EMERGENCY DEPT VISIT LOW MDM: CPT

## 2019-06-02 PROCEDURE — 73030 X-RAY EXAM OF SHOULDER: CPT

## 2019-06-02 NOTE — ERD
ER Documentation


Chief Complaint


Chief Complaint





rt shoulder pain x 5 days





HPI


29-year-old male right-hand-dominant is here with right shoulder pain for the 


past 6 days.  He thinks he may have tweaked intermittent back causing the pain 


but no direct trauma or fall or injury.  He is been taking Tylenol and Motrin 


which does help temporarily.  No numbness or tingling or loss of range of 


motion.





ROS


All systems reviewed and are negative except as per history of present illness.





Medications


Home Meds


Active Scripts


Ibuprofen* (Motrin*) 800 Mg Tab, 800 MG PO Q6, #30 TAB


   Prov:KAITY RENTERIA PA-C         6/2/19


Cyclobenzaprine Hcl* (Cyclobenzaprine Hcl*) 10 Mg Tablet, 10 MG PO BID, #15 TAB


   Prov:KAITY RENTERIA PA-C         6/2/19


Tramadol HCl (Tramadol HCl) 50 Mg Tablet, 50 MG PO Q6H PRN for PAIN, #30 TAB


   Prov:NAVDEEP BROUSSARD NP         1/18/17


Reported Medications


Fexofenadine/Pseudoephedrine (Allegra-D 24 Hour Tablet) 1 Each Tab.er.24h, 1 


EACH PO DAILY


   1/16/17





Allergies


Allergies:  


Coded Allergies:  


     No Known Allergy (Unverified , 1/16/17)





PMhx/Soc


History of Surgery:  No


Anesthesia Reaction:  No


Hx Neurological Disorder:  No


Hx Respiratory Disorders:  No


Hx Cardiac Disorders:  No


Hx Psychiatric Problems:  No


Hx Alcohol Use:  No


Hx Substance Use:  No


Hx Tobacco Use:  Yes


Smoking Status:  Never smoker





FmHx


Family History:  No diabetes





Physical Exam


Vitals





Vital Signs


  Date      Temp  Pulse  Resp  B/P (MAP)   Pulse Ox  O2          O2 Flow    FiO2


Time                                                 Delivery    Rate


    6/2/19  98.2     74    18      127/71        99


     17:59                           (89)





Physical Exam


Const:   No acute distress


Head:   Atraumatic 


Eyes:    Normal Conjunctiva


ENT:    Normal External Ears, Nose and Mouth.


Neck:               Full range of motion. No meningismus.


Resp:   Clear to auscultation bilaterally


Cardio:   Regular rate and rhythm, no murmurs


 Upper Extremity - bilateral:


 Skin:                      [No laceration, or evidence of external trauma]


 Compartments:   [Soft]


 Motor:                      [Full active range of motion shoulder/elbow/ 


 Bones:                  [Nontender humerus/elbow 


 Snuffbox:               [Nontender]


 Joints:                        [No effusion]


 Pulses/Perfusion:     [2+ radial, Capillary refill < 2 seconds]





Procedures/MDM


Patient has shoulder pain.  Neurovascular intact.  X-rays negative.  He declined


any pain medication here but he was discharged with ibuprofen and Flexeril.  


Patient counseled regarding my diagnostic impression and care plan. Prior to 


discharge all questions answered. Pt agrees with treatment plan and understands 


strict return precautions. Pt is instructed to follow up with primary care 


provider within 24-48 hours. Precautionary instructions provided including 


instructions to return to the ER if not improving or for any worsening or 


changing symptoms or concerns.





Departure


Diagnosis:  


   Primary Impression:  


   Shoulder pain


Condition:  Stable


Patient Instructions:  Shoulder Pain (Uncertain Cause)





Additional Instructions:  


Call your primary care doctor TOMORROW for an appointment during the next 1-2 


days.See the doctor sooner or return here if your condition worsens before your 


appointment time.











KAITY RENTERIA PA-C             Jun 2, 2019 18:58